# Patient Record
Sex: MALE
[De-identification: names, ages, dates, MRNs, and addresses within clinical notes are randomized per-mention and may not be internally consistent; named-entity substitution may affect disease eponyms.]

---

## 2023-03-03 PROBLEM — Z00.00 ENCOUNTER FOR PREVENTIVE HEALTH EXAMINATION: Status: ACTIVE | Noted: 2023-03-03

## 2023-03-08 ENCOUNTER — APPOINTMENT (OUTPATIENT)
Dept: PULMONOLOGY | Facility: CLINIC | Age: 43
End: 2023-03-08
Payer: MEDICAID

## 2023-03-29 ENCOUNTER — NON-APPOINTMENT (OUTPATIENT)
Age: 43
End: 2023-03-29

## 2023-03-29 ENCOUNTER — APPOINTMENT (OUTPATIENT)
Dept: PULMONOLOGY | Facility: CLINIC | Age: 43
End: 2023-03-29
Payer: MEDICAID

## 2023-03-29 VITALS
DIASTOLIC BLOOD PRESSURE: 63 MMHG | WEIGHT: 159 LBS | BODY MASS INDEX: 21.54 KG/M2 | SYSTOLIC BLOOD PRESSURE: 99 MMHG | OXYGEN SATURATION: 96 % | HEART RATE: 71 BPM | TEMPERATURE: 97.8 F | HEIGHT: 72 IN

## 2023-03-29 DIAGNOSIS — R93.89 ABNORMAL FINDINGS ON DIAGNOSTIC IMAGING OF OTHER SPECIFIED BODY STRUCTURES: ICD-10-CM

## 2023-03-29 DIAGNOSIS — Z87.2 PERSONAL HISTORY OF DISEASES OF THE SKIN AND SUBCUTANEOUS TISSUE: ICD-10-CM

## 2023-03-29 DIAGNOSIS — Z78.9 OTHER SPECIFIED HEALTH STATUS: ICD-10-CM

## 2023-03-29 PROCEDURE — 99204 OFFICE O/P NEW MOD 45 MIN: CPT | Mod: 25

## 2023-03-29 PROCEDURE — 76604 US EXAM CHEST: CPT

## 2023-03-29 NOTE — REVIEW OF SYSTEMS
[Cough] : cough [Pleuritic Pain] : pleuritic pain [Negative] : Endocrine [Frequent URIs] : no frequent URIs [Sputum] : no sputum [Dyspnea] : no dyspnea [Wheezing] : no wheezing [SOB on Exertion] : no sob on exertion

## 2023-03-29 NOTE — HISTORY OF PRESENT ILLNESS
[Never] : never [TextBox_4] : 42 yr old male with history of psoriasis who is here for a recent hospital follow up. The patient at that time noted left sided pleuritic chest pain. Just prior to the pleuritic chest pain, the patient states that he had a recent flu-like illness (was never swabbed for RVP/COVID test at that time). CT abd/pulvis was ordered that showed a pleural effusion. This was followed by a CXR that showed a small left sided pleural effusion and no acute infiltrate.  Denies SOB.  Pain gets worse when active or lying down.  He has been taking Motrin 500 mg nightly due to pain.  Denies fevers, chills, nausea, vomiting, or night sweats. He has occasional nonproductive coughing. No sick contacts or recent traveling. Denies signs of aspiration. Denies recent ETOH use. Never smoker. No family history of lung disease or lung cancer.  [ESS] : 0

## 2023-03-29 NOTE — PROCEDURE
[Thoracic Ultrasound] : Thoracic Ultrasound [A line] : A line: Yes [Simple] : simple [Lung sliding] : Lung sliding: Yes [Pleural Effusion] : Pleural Effusion: No [B line] : B line: No [Consolidation] : Consolidation: No [de-identified] : h/o pleural effusion [FreeTextEntry2] : A-lines b/l. Left small simple appearing pleural effusion. Small consolidation in the LLL.

## 2023-03-29 NOTE — END OF VISIT
[Time Spent: ___ minutes] : I have spent [unfilled] minutes of time on the encounter. [] : Fellow [FreeTextEntry3] : Inform the patient that the pleural effusion is very small.  All we will repeat the ultrasound next visit.  At this point there is no evidence of any underlying pulmonary pathology.  The etiology of the chest pain and the pleural effusion could be related to viral illness.  No evidence of pneumothorax.  Patient instructed to call with any change in his status.  Follow-up in 3 months with PFT and work-up for connective tissue [>50% of the face to face encounter time was spent on counseling and/or coordination of care for ___] : Greater than 50% of the face to face encounter time was spent on counseling and/or coordination of care for [unfilled]

## 2023-03-29 NOTE — ASSESSMENT
[FreeTextEntry1] : Data reviewed: CT abd/pelvis and CXR in 2023 that showed left sided plueral effusion and no consolidation or opacification consistent with an infiltrate.\par \par #Pleurisy\par #Left sided small pleural effusion, simple appearing\par #Recent flu-like symptoms\par \par Plan:\par Patient has a history of psoriasis and presented with pleurisy- other potential diagnoses on the differential include autoimmune/rheumatologic (eg, SLE, RA, MCTD, etc) or a small PE. Likely 2/2 viral pneumonia. \par Serially montior the pleural effusion for now and treat conservatively with OTC pain medications.\par Will reassess the patient in 3 months.

## 2023-03-29 NOTE — PHYSICAL EXAM
[No Acute Distress] : no acute distress [Normal Oropharynx] : normal oropharynx [Normal Appearance] : normal appearance [No Neck Mass] : no neck mass [Normal Rate/Rhythm] : normal rate/rhythm [Normal S1, S2] : normal s1, s2 [No Murmurs] : no murmurs [No Resp Distress] : no resp distress [No Abnormalities] : no abnormalities [Benign] : benign [Normal Gait] : normal gait [No Clubbing] : no clubbing [No Cyanosis] : no cyanosis [No Edema] : no edema [FROM] : FROM [Normal Color/ Pigmentation] : normal color/ pigmentation [No Focal Deficits] : no focal deficits [Oriented x3] : oriented x3 [Normal Affect] : normal affect [TextBox_2] : tall skinny male [TextBox_68] : diminished breath sounds at left lung base

## 2023-06-26 ENCOUNTER — APPOINTMENT (OUTPATIENT)
Dept: PULMONOLOGY | Facility: CLINIC | Age: 43
End: 2023-06-26
Payer: MEDICAID

## 2023-06-26 VITALS
HEART RATE: 64 BPM | SYSTOLIC BLOOD PRESSURE: 96 MMHG | DIASTOLIC BLOOD PRESSURE: 61 MMHG | OXYGEN SATURATION: 97 % | TEMPERATURE: 96.6 F | WEIGHT: 160 LBS | HEIGHT: 72 IN | BODY MASS INDEX: 21.67 KG/M2

## 2023-06-26 DIAGNOSIS — J90 PLEURAL EFFUSION, NOT ELSEWHERE CLASSIFIED: ICD-10-CM

## 2023-06-26 PROCEDURE — 76604 US EXAM CHEST: CPT

## 2023-06-26 PROCEDURE — 99214 OFFICE O/P EST MOD 30 MIN: CPT | Mod: 25

## 2023-06-26 NOTE — END OF VISIT
[] : Fellow [FreeTextEntry3] :  stable with resolution of his symptoms.  The ultrasound revealed resolution of the effusion.  Follow-up as needed basis [Time Spent: ___ minutes] : I have spent [unfilled] minutes of time on the encounter. [>50% of the face to face encounter time was spent on counseling and/or coordination of care for ___] : Greater than 50% of the face to face encounter time was spent on counseling and/or coordination of care for [unfilled]

## 2023-06-26 NOTE — ASSESSMENT
[FreeTextEntry1] : 42 year old male referred originally for L sided pleuritic chest pain with small L sided effusion\par \par L pleural effusion\par \par Effusion likely secondary to pain and slight inflammation. Patient pain has now resolved and bedside US shows no signs of effusion present. Etiology of effusion is unclear at this time likely secondary to pleurisy and inflammatory response vs viral process that is now improved as well and was not able to be caught at the time. Will plan to monitor for now and return to clinic if pain returns at that time will do more extensive CTD workup.\par \par - Pleurisy resolved likely viral with effusion resolved\par - Return to clinic if symptoms return\par - Bedside US shows resolution of effusion

## 2023-06-26 NOTE — HISTORY OF PRESENT ILLNESS
[Never] : never [TextBox_4] : 42 year old male originally send for L sided pleuritic chest pain and small L sided effusion. Patient states that for the last few months pain has now resolved and he has not noticed it return. Able to do all his daily activities with no restrictions and states he has returned to his normal state of health. Psoriasis is well controlled and denies any notices of swelling in joints or legs. [ESS] : 0

## 2023-06-26 NOTE — PROCEDURE
[Thoracic Ultrasound] : Thoracic Ultrasound [A line] : A line: Yes [Lung sliding] : Lung sliding: Yes [B line] : B line: No [Pleural Effusion] : Pleural Effusion: No [Consolidation] : Consolidation: No